# Patient Record
Sex: FEMALE | Race: WHITE | NOT HISPANIC OR LATINO | Employment: OTHER | URBAN - METROPOLITAN AREA
[De-identification: names, ages, dates, MRNs, and addresses within clinical notes are randomized per-mention and may not be internally consistent; named-entity substitution may affect disease eponyms.]

---

## 2023-08-21 ENCOUNTER — HOSPITAL ENCOUNTER (OUTPATIENT)
Dept: DATA CONVERSION | Facility: HOSPITAL | Age: 63
Discharge: HOME | End: 2023-08-21
Payer: COMMERCIAL

## 2023-08-21 DIAGNOSIS — Z12.31 ENCOUNTER FOR SCREENING MAMMOGRAM FOR MALIGNANT NEOPLASM OF BREAST: ICD-10-CM

## 2023-09-19 PROBLEM — D22.5 MELANOCYTIC NEVI OF TRUNK: Status: ACTIVE | Noted: 2018-01-26

## 2023-09-19 PROBLEM — E80.4 GILBERTS DISEASE: Status: ACTIVE | Noted: 2023-04-17

## 2023-09-19 PROBLEM — L82.1 OTHER SEBORRHEIC KERATOSIS: Status: ACTIVE | Noted: 2018-01-26

## 2023-09-19 PROBLEM — E83.42 HYPOMAGNESEMIA: Status: ACTIVE | Noted: 2020-07-30

## 2023-09-19 PROBLEM — D22.62 MELANOCYTIC NEVI OF LEFT UPPER LIMB, INCLUDING SHOULDER: Status: ACTIVE | Noted: 2018-01-26

## 2023-09-19 PROBLEM — M25.859 FEMOROACETABULAR IMPINGEMENT: Status: ACTIVE | Noted: 2023-09-19

## 2023-09-19 PROBLEM — E55.9 VITAMIN D DEFICIENCY: Status: ACTIVE | Noted: 2019-10-14

## 2023-09-19 PROBLEM — R73.09 ELEVATED GLUCOSE: Status: ACTIVE | Noted: 2019-10-15

## 2023-09-19 PROBLEM — E53.8 COBALAMIN DEFICIENCY: Status: ACTIVE | Noted: 2019-10-14

## 2023-09-19 PROBLEM — L57.0 ACTINIC KERATOSIS: Status: ACTIVE | Noted: 2018-01-26

## 2023-09-19 PROBLEM — K13.79 MOUTH SORES: Status: ACTIVE | Noted: 2023-04-17

## 2023-09-19 PROBLEM — D49.2 NEOPLASM OF UNSPECIFIED BEHAVIOR OF BONE, SOFT TISSUE, AND SKIN: Status: ACTIVE | Noted: 2018-01-26

## 2023-09-19 PROBLEM — R93.89 ABNORMAL CHEST CT: Status: ACTIVE | Noted: 2019-10-14

## 2023-09-19 PROBLEM — E78.2 MIXED HYPERLIPIDEMIA: Status: ACTIVE | Noted: 2019-11-26

## 2023-09-19 PROBLEM — M54.6 THORACIC BACK PAIN: Status: ACTIVE | Noted: 2022-05-16

## 2023-09-19 PROBLEM — R10.32 LLQ ABDOMINAL PAIN: Status: ACTIVE | Noted: 2022-05-10

## 2023-09-19 PROBLEM — D22.70 MELANOCYTIC NEVI OF LOWER LIMB, INCLUDING HIP: Status: ACTIVE | Noted: 2018-01-26

## 2023-09-19 PROBLEM — M25.551 HIP PAIN, BILATERAL: Status: ACTIVE | Noted: 2023-09-19

## 2023-09-19 PROBLEM — L91.8 OTHER HYPERTROPHIC DISORDERS OF THE SKIN: Status: ACTIVE | Noted: 2018-01-26

## 2023-09-19 PROBLEM — R35.0 URINE FREQUENCY: Status: ACTIVE | Noted: 2022-05-10

## 2023-09-19 PROBLEM — M25.552 HIP PAIN, BILATERAL: Status: ACTIVE | Noted: 2023-09-19

## 2023-09-19 PROBLEM — S29.012A UPPER BACK STRAIN: Status: ACTIVE | Noted: 2022-05-10

## 2023-09-19 PROBLEM — M54.2 NECK PAIN WITHOUT INJURY: Status: ACTIVE | Noted: 2021-01-04

## 2023-09-19 RX ORDER — ROSUVASTATIN CALCIUM 5 MG/1
TABLET, COATED ORAL
COMMUNITY
End: 2023-10-16 | Stop reason: SDUPTHER

## 2023-09-19 RX ORDER — VIT C/E/ZN/COPPR/LUTEIN/ZEAXAN 250MG-90MG
25 CAPSULE ORAL DAILY
COMMUNITY
Start: 2020-07-30

## 2023-09-19 RX ORDER — BACLOFEN 20 MG
500 TABLET ORAL DAILY
COMMUNITY
Start: 2022-04-11

## 2023-09-19 RX ORDER — MELOXICAM 15 MG/1
1 TABLET ORAL DAILY PRN
COMMUNITY
Start: 2022-01-31 | End: 2023-10-16 | Stop reason: ALTCHOICE

## 2023-10-16 ENCOUNTER — OFFICE VISIT (OUTPATIENT)
Dept: PRIMARY CARE | Facility: CLINIC | Age: 63
End: 2023-10-16
Payer: COMMERCIAL

## 2023-10-16 VITALS — DIASTOLIC BLOOD PRESSURE: 70 MMHG | SYSTOLIC BLOOD PRESSURE: 124 MMHG

## 2023-10-16 DIAGNOSIS — E78.2 MIXED HYPERLIPIDEMIA: ICD-10-CM

## 2023-10-16 PROCEDURE — 1036F TOBACCO NON-USER: CPT | Performed by: FAMILY MEDICINE

## 2023-10-16 PROCEDURE — 99213 OFFICE O/P EST LOW 20 MIN: CPT | Performed by: FAMILY MEDICINE

## 2023-10-16 RX ORDER — ROSUVASTATIN CALCIUM 5 MG/1
TABLET, COATED ORAL
Qty: 36 TABLET | Refills: 1 | Status: SHIPPED | OUTPATIENT
Start: 2023-10-16 | End: 2024-04-15 | Stop reason: SDUPTHER

## 2023-10-16 RX ORDER — LANOLIN ALCOHOL/MO/W.PET/CERES
100 CREAM (GRAM) TOPICAL DAILY
COMMUNITY

## 2023-10-16 ASSESSMENT — ENCOUNTER SYMPTOMS
CHEST TIGHTNESS: 0
ARTHRALGIAS: 0
ABDOMINAL PAIN: 0
UNEXPECTED WEIGHT CHANGE: 0
AGITATION: 0
HEADACHES: 0
WEAKNESS: 0
COUGH: 0
SHORTNESS OF BREATH: 0

## 2023-10-16 ASSESSMENT — PATIENT HEALTH QUESTIONNAIRE - PHQ9
2. FEELING DOWN, DEPRESSED OR HOPELESS: NOT AT ALL
1. LITTLE INTEREST OR PLEASURE IN DOING THINGS: NOT AT ALL
SUM OF ALL RESPONSES TO PHQ9 QUESTIONS 1 AND 2: 0

## 2023-10-16 NOTE — PROGRESS NOTES
Patient is here today for cholesterol recheck.  She is currently taking crestor 5mg daily 3 days per week.  She says she is doing well.  No new issues or problems.    Mammogram-  up to date done by GYN per patient  Colonoscopy   2019  Tdap  5/2019

## 2023-10-16 NOTE — PROGRESS NOTES
Subjective   Patient ID: Valeria Kraus is a 63 y.o. female who presents for Hyperlipidemia.  Hyperlipidemia  Pertinent negatives include no chest pain or shortness of breath.     Patient is here today for cholesterol recheck.  She is currently taking crestor 5mg daily 3 days per week.  She says she is doing well.  No new issues or problems.    Mammogram-  up to date done by GYN per patient  Colonoscopy   2019  Tdap  5/2019    HPI reviewed/agree  Generally pt feels fine  almost no dairy  no questions    Review of Systems   Constitutional:  Negative for unexpected weight change.   Respiratory:  Negative for cough, chest tightness and shortness of breath.    Cardiovascular:  Negative for chest pain.   Gastrointestinal:  Negative for abdominal pain.   Musculoskeletal:  Negative for arthralgias.   Skin:  Negative for rash.   Neurological:  Negative for weakness and headaches.   Psychiatric/Behavioral:  Negative for agitation.        Objective   Physical Exam  Constitutional:       Appearance: Normal appearance.   HENT:      Head: Normocephalic and atraumatic.      Nose: Nose normal.      Mouth/Throat:      Pharynx: Oropharynx is clear.   Eyes:      Extraocular Movements: Extraocular movements intact.      Pupils: Pupils are equal, round, and reactive to light.   Cardiovascular:      Rate and Rhythm: Normal rate and regular rhythm.      Pulses: Normal pulses.      Heart sounds: Normal heart sounds. No murmur heard.  Pulmonary:      Effort: Pulmonary effort is normal.      Breath sounds: Normal breath sounds.   Abdominal:      General: Abdomen is flat. Bowel sounds are normal.      Palpations: Abdomen is soft.      Tenderness: There is no abdominal tenderness.   Musculoskeletal:         General: Normal range of motion.   Skin:     General: Skin is warm and dry.   Neurological:      General: No focal deficit present.      Mental Status: She is alert and oriented to person, place, and time.   Psychiatric:         Mood and  Affect: Mood normal.         Behavior: Behavior normal.         Assessment/Plan   Diagnoses and all orders for this visit:  Mixed hyperlipidemia  -     rosuvastatin (Crestor) 5 mg tablet; take 1 tablet by mouth 3 times a week on [id number]  -     Lipid Panel; Future  -     Hepatic Function Panel; Future

## 2023-10-17 ENCOUNTER — LAB (OUTPATIENT)
Dept: LAB | Facility: LAB | Age: 63
End: 2023-10-17
Payer: COMMERCIAL

## 2023-10-17 DIAGNOSIS — E78.2 MIXED HYPERLIPIDEMIA: ICD-10-CM

## 2023-10-17 LAB
CHOLEST SERPL-MCNC: 214 MG/DL (ref 133–200)
CHOLEST/HDLC SERPL: 2.2 {RATIO}
HDLC SERPL-MCNC: 96 MG/DL
LDLC SERPL CALC-MCNC: 100 MG/DL (ref 65–130)
TRIGL SERPL-MCNC: 89 MG/DL (ref 40–150)

## 2023-10-17 PROCEDURE — 36415 COLL VENOUS BLD VENIPUNCTURE: CPT

## 2023-10-17 PROCEDURE — 80061 LIPID PANEL: CPT

## 2023-10-17 PROCEDURE — 80076 HEPATIC FUNCTION PANEL: CPT

## 2023-10-19 DIAGNOSIS — E78.2 MIXED HYPERLIPIDEMIA: Primary | ICD-10-CM

## 2023-10-19 LAB
ALBUMIN SERPL-MCNC: 4.3 G/DL (ref 3.5–5)
ALP BLD-CCNC: 51 U/L (ref 35–125)
ALT SERPL-CCNC: 21 U/L (ref 5–40)
AST SERPL-CCNC: 24 U/L (ref 5–40)
BILIRUB DIRECT SERPL-MCNC: 0.3 MG/DL (ref 0–0.2)
BILIRUB SERPL-MCNC: 1.4 MG/DL (ref 0.1–1.2)
PROT SERPL-MCNC: 6.6 G/DL (ref 5.9–7.9)

## 2024-04-15 ENCOUNTER — OFFICE VISIT (OUTPATIENT)
Dept: PRIMARY CARE | Facility: CLINIC | Age: 64
End: 2024-04-15
Payer: COMMERCIAL

## 2024-04-15 VITALS
HEART RATE: 67 BPM | OXYGEN SATURATION: 98 % | BODY MASS INDEX: 21.93 KG/M2 | DIASTOLIC BLOOD PRESSURE: 82 MMHG | WEIGHT: 123.8 LBS | SYSTOLIC BLOOD PRESSURE: 120 MMHG

## 2024-04-15 DIAGNOSIS — Z13.1 SCREENING FOR DIABETES MELLITUS: ICD-10-CM

## 2024-04-15 DIAGNOSIS — N95.9 MENOPAUSAL DISORDER: ICD-10-CM

## 2024-04-15 DIAGNOSIS — Z13.6 SCREENING, ISCHEMIC HEART DISEASE: Primary | ICD-10-CM

## 2024-04-15 DIAGNOSIS — E78.2 MIXED HYPERLIPIDEMIA: ICD-10-CM

## 2024-04-15 PROCEDURE — 99213 OFFICE O/P EST LOW 20 MIN: CPT | Performed by: PHYSICIAN ASSISTANT

## 2024-04-15 PROCEDURE — 1036F TOBACCO NON-USER: CPT | Performed by: PHYSICIAN ASSISTANT

## 2024-04-15 RX ORDER — ROSUVASTATIN CALCIUM 5 MG/1
TABLET, COATED ORAL
Qty: 36 TABLET | Refills: 1 | Status: SHIPPED | OUTPATIENT
Start: 2024-04-15

## 2024-04-15 ASSESSMENT — ENCOUNTER SYMPTOMS
LOSS OF SENSATION IN FEET: 0
DEPRESSION: 0
OCCASIONAL FEELINGS OF UNSTEADINESS: 0
FEVER: 0
FATIGUE: 0
ABDOMINAL PAIN: 0
SHORTNESS OF BREATH: 0

## 2024-04-15 ASSESSMENT — PATIENT HEALTH QUESTIONNAIRE - PHQ9
SUM OF ALL RESPONSES TO PHQ9 QUESTIONS 1 AND 2: 0
1. LITTLE INTEREST OR PLEASURE IN DOING THINGS: NOT AT ALL
2. FEELING DOWN, DEPRESSED OR HOPELESS: NOT AT ALL

## 2024-04-15 ASSESSMENT — COLUMBIA-SUICIDE SEVERITY RATING SCALE - C-SSRS
1. IN THE PAST MONTH, HAVE YOU WISHED YOU WERE DEAD OR WISHED YOU COULD GO TO SLEEP AND NOT WAKE UP?: NO
6. HAVE YOU EVER DONE ANYTHING, STARTED TO DO ANYTHING, OR PREPARED TO DO ANYTHING TO END YOUR LIFE?: NO
2. HAVE YOU ACTUALLY HAD ANY THOUGHTS OF KILLING YOURSELF?: NO

## 2024-04-15 ASSESSMENT — PAIN SCALES - GENERAL: PAINLEVEL: 0-NO PAIN

## 2024-04-15 NOTE — PROGRESS NOTES
Subjective   Patient ID: Valeria Kraus is a 63 y.o. female who presents for Establish Care (Pt is here to establish care, and medication refill / nr).    HPI   Previous Dr. Rios pt    HLD - controlled on 5mg rosuvastatin 3 days/wk. Nonsmoker. +FamHx MI (mom). Has never had CT Ca score    HM: mammogram, colonoscopy, pap UTD. Due for DEXA.    Review of Systems   Constitutional:  Negative for fatigue and fever.   Respiratory:  Negative for shortness of breath.    Cardiovascular:  Negative for chest pain.   Gastrointestinal:  Negative for abdominal pain.   Skin:  Negative for rash.       Objective   /82   Pulse 67   Wt 56.2 kg (123 lb 12.8 oz)   SpO2 98%   BMI 21.93 kg/m²     Physical Exam  Constitutional:       Appearance: Normal appearance.   HENT:      Head: Normocephalic and atraumatic.   Eyes:      Extraocular Movements: Extraocular movements intact.      Conjunctiva/sclera: Conjunctivae normal.   Cardiovascular:      Rate and Rhythm: Normal rate and regular rhythm.      Heart sounds: Normal heart sounds.   Pulmonary:      Effort: Pulmonary effort is normal.      Breath sounds: Normal breath sounds. No wheezing or rhonchi.   Musculoskeletal:      Cervical back: Normal range of motion and neck supple.   Skin:     General: Skin is warm.      Findings: No rash.   Neurological:      General: No focal deficit present.      Mental Status: She is alert.         Assessment/Plan   Problem List Items Addressed This Visit             ICD-10-CM    Mixed hyperlipidemia E78.2    Relevant Medications    rosuvastatin (Crestor) 5 mg tablet     Other Visit Diagnoses         Codes    Screening, ischemic heart disease    -  Primary Z13.6    Relevant Orders    CT cardiac scoring wo IV contrast    Menopausal disorder     N95.9    Relevant Orders    XR DEXA bone density    Screening for diabetes mellitus     Z13.1    Relevant Orders    Comprehensive Metabolic Panel

## 2024-04-17 ENCOUNTER — HOSPITAL ENCOUNTER (OUTPATIENT)
Dept: RADIOLOGY | Facility: CLINIC | Age: 64
Discharge: HOME | End: 2024-04-17
Payer: COMMERCIAL

## 2024-04-17 DIAGNOSIS — N95.9 MENOPAUSAL DISORDER: ICD-10-CM

## 2024-04-17 PROCEDURE — 77080 DXA BONE DENSITY AXIAL: CPT

## 2024-04-25 ENCOUNTER — LAB (OUTPATIENT)
Dept: LAB | Facility: LAB | Age: 64
End: 2024-04-25
Payer: COMMERCIAL

## 2024-04-25 DIAGNOSIS — Z13.1 SCREENING FOR DIABETES MELLITUS: ICD-10-CM

## 2024-04-25 LAB
ALBUMIN SERPL-MCNC: 4.3 G/DL (ref 3.5–5)
ALP BLD-CCNC: 52 U/L (ref 35–125)
ALT SERPL-CCNC: 21 U/L (ref 5–40)
ANION GAP SERPL CALC-SCNC: 11 MMOL/L
AST SERPL-CCNC: 28 U/L (ref 5–40)
BILIRUB SERPL-MCNC: 1.2 MG/DL (ref 0.1–1.2)
BUN SERPL-MCNC: 18 MG/DL (ref 8–25)
CALCIUM SERPL-MCNC: 9.4 MG/DL (ref 8.5–10.4)
CHLORIDE SERPL-SCNC: 101 MMOL/L (ref 97–107)
CO2 SERPL-SCNC: 26 MMOL/L (ref 24–31)
CREAT SERPL-MCNC: 0.9 MG/DL (ref 0.4–1.6)
EGFRCR SERPLBLD CKD-EPI 2021: 72 ML/MIN/1.73M*2
GLUCOSE SERPL-MCNC: 90 MG/DL (ref 65–99)
POTASSIUM SERPL-SCNC: 4.6 MMOL/L (ref 3.4–5.1)
PROT SERPL-MCNC: 6.6 G/DL (ref 5.9–7.9)
SODIUM SERPL-SCNC: 138 MMOL/L (ref 133–145)

## 2024-04-25 PROCEDURE — 36415 COLL VENOUS BLD VENIPUNCTURE: CPT

## 2024-04-25 PROCEDURE — 80053 COMPREHEN METABOLIC PANEL: CPT

## 2024-07-05 ENCOUNTER — HOSPITAL ENCOUNTER (OUTPATIENT)
Dept: RADIOLOGY | Facility: CLINIC | Age: 64
Discharge: HOME | End: 2024-07-05
Payer: MEDICARE

## 2024-07-05 DIAGNOSIS — Z13.6 SCREENING, ISCHEMIC HEART DISEASE: ICD-10-CM

## 2024-07-05 PROCEDURE — 75571 CT HRT W/O DYE W/CA TEST: CPT

## 2024-09-26 DIAGNOSIS — E78.2 MIXED HYPERLIPIDEMIA: ICD-10-CM

## 2024-09-26 RX ORDER — ROSUVASTATIN CALCIUM 5 MG/1
TABLET, COATED ORAL
Qty: 36 TABLET | Refills: 1 | Status: SHIPPED | OUTPATIENT
Start: 2024-09-26

## 2024-10-17 ENCOUNTER — OFFICE VISIT (OUTPATIENT)
Dept: PRIMARY CARE | Facility: CLINIC | Age: 64
End: 2024-10-17
Payer: MEDICARE

## 2024-10-17 VITALS
HEART RATE: 61 BPM | OXYGEN SATURATION: 97 % | DIASTOLIC BLOOD PRESSURE: 78 MMHG | SYSTOLIC BLOOD PRESSURE: 122 MMHG | WEIGHT: 119 LBS | BODY MASS INDEX: 21.08 KG/M2

## 2024-10-17 DIAGNOSIS — Z12.31 BREAST CANCER SCREENING BY MAMMOGRAM: ICD-10-CM

## 2024-10-17 DIAGNOSIS — Z00.00 ROUTINE MEDICAL EXAM: ICD-10-CM

## 2024-10-17 DIAGNOSIS — Z13.6 SCREENING, ISCHEMIC HEART DISEASE: ICD-10-CM

## 2024-10-17 DIAGNOSIS — Z13.1 SCREENING FOR DIABETES MELLITUS: ICD-10-CM

## 2024-10-17 DIAGNOSIS — Z00.00 ROUTINE GENERAL MEDICAL EXAMINATION AT HEALTH CARE FACILITY: Primary | ICD-10-CM

## 2024-10-17 DIAGNOSIS — Z11.59 ENCOUNTER FOR HEPATITIS C SCREENING TEST FOR LOW RISK PATIENT: ICD-10-CM

## 2024-10-17 DIAGNOSIS — Z28.21 23-POLYVALENT PNEUMOCOCCAL POLYSACCHARIDE VACCINE DECLINED: ICD-10-CM

## 2024-10-17 DIAGNOSIS — E78.2 MIXED HYPERLIPIDEMIA: ICD-10-CM

## 2024-10-17 DIAGNOSIS — E55.9 VITAMIN D DEFICIENCY: ICD-10-CM

## 2024-10-17 PROBLEM — S52.502A FRACTURE OF RADIUS, DISTAL, LEFT, CLOSED: Status: RESOLVED | Noted: 2019-06-11 | Resolved: 2024-10-17

## 2024-10-17 PROBLEM — R93.89 ABNORMAL CHEST CT: Status: RESOLVED | Noted: 2019-10-14 | Resolved: 2024-10-17

## 2024-10-17 LAB
POC APPEARANCE, URINE: CLEAR
POC BILIRUBIN, URINE: NEGATIVE
POC BLOOD, URINE: NEGATIVE
POC COLOR, URINE: YELLOW
POC GLUCOSE, URINE: NEGATIVE MG/DL
POC KETONES, URINE: NEGATIVE MG/DL
POC LEUKOCYTES, URINE: NEGATIVE
POC NITRITE,URINE: NEGATIVE
POC PH, URINE: 7.5 PH
POC PROTEIN, URINE: NEGATIVE MG/DL
POC SPECIFIC GRAVITY, URINE: 1.01
POC UROBILINOGEN, URINE: 0.2 EU/DL

## 2024-10-17 PROCEDURE — G0439 PPPS, SUBSEQ VISIT: HCPCS | Performed by: PHYSICIAN ASSISTANT

## 2024-10-17 PROCEDURE — 1160F RVW MEDS BY RX/DR IN RCRD: CPT | Performed by: PHYSICIAN ASSISTANT

## 2024-10-17 PROCEDURE — 1124F ACP DISCUSS-NO DSCNMKR DOCD: CPT | Performed by: PHYSICIAN ASSISTANT

## 2024-10-17 PROCEDURE — 1125F AMNT PAIN NOTED PAIN PRSNT: CPT | Performed by: PHYSICIAN ASSISTANT

## 2024-10-17 PROCEDURE — 99215 OFFICE O/P EST HI 40 MIN: CPT | Performed by: PHYSICIAN ASSISTANT

## 2024-10-17 PROCEDURE — 1170F FXNL STATUS ASSESSED: CPT | Performed by: PHYSICIAN ASSISTANT

## 2024-10-17 PROCEDURE — 81003 URINALYSIS AUTO W/O SCOPE: CPT | Performed by: PHYSICIAN ASSISTANT

## 2024-10-17 PROCEDURE — 93010 ELECTROCARDIOGRAM REPORT: CPT | Performed by: PHYSICIAN ASSISTANT

## 2024-10-17 PROCEDURE — 1036F TOBACCO NON-USER: CPT | Performed by: PHYSICIAN ASSISTANT

## 2024-10-17 PROCEDURE — 1159F MED LIST DOCD IN RCRD: CPT | Performed by: PHYSICIAN ASSISTANT

## 2024-10-17 ASSESSMENT — ENCOUNTER SYMPTOMS
WEAKNESS: 0
SHORTNESS OF BREATH: 0
ABDOMINAL PAIN: 0
DIZZINESS: 0
BLOOD IN STOOL: 0
NUMBNESS: 0
PALPITATIONS: 0
LIGHT-HEADEDNESS: 0
CONSTIPATION: 0
APPETITE CHANGE: 0
WHEEZING: 0
COUGH: 0
FEVER: 0
FATIGUE: 0
DIARRHEA: 0

## 2024-10-17 ASSESSMENT — ACTIVITIES OF DAILY LIVING (ADL)
ADEQUATE_TO_COMPLETE_ADL: YES
STIL DRIVING: YES
BATHING: INDEPENDENT
TAKING MEDICATION: INDEPENDENT
PILL BOX USED: NO
DOING HOUSEWORK: INDEPENDENT
JUDGMENT_ADEQUATE_SAFELY_COMPLETE_DAILY_ACTIVITIES: YES
NEEDS ASSISTANCE WITH FOOD: INDEPENDENT
USING TELEPHONE: INDEPENDENT
MANAGING FINANCES: INDEPENDENT
PREPARING MEALS: INDEPENDENT
EATING: INDEPENDENT
GROCERY SHOPPING: INDEPENDENT
TOILETING: INDEPENDENT
USING TRANSPORTATION: INDEPENDENT
FEEDING: INDEPENDENT
DRESSING: INDEPENDENT

## 2024-10-17 ASSESSMENT — COLUMBIA-SUICIDE SEVERITY RATING SCALE - C-SSRS
6. HAVE YOU EVER DONE ANYTHING, STARTED TO DO ANYTHING, OR PREPARED TO DO ANYTHING TO END YOUR LIFE?: NO
2. HAVE YOU ACTUALLY HAD ANY THOUGHTS OF KILLING YOURSELF?: NO
1. IN THE PAST MONTH, HAVE YOU WISHED YOU WERE DEAD OR WISHED YOU COULD GO TO SLEEP AND NOT WAKE UP?: NO

## 2024-10-17 ASSESSMENT — VISUAL ACUITY
OS_CC: 20/25
OD_CC: 20/25

## 2024-10-17 ASSESSMENT — COGNITIVE AND FUNCTIONAL STATUS - GENERAL
VERBAL FLUENCY - ANIMAL NAMES (0 TO 25): 3
TRAIL MAKING TEST: PATIENT COMPLETES TRAIL MAKING TEST PROPERLY.

## 2024-10-17 ASSESSMENT — PATIENT HEALTH QUESTIONNAIRE - PHQ9
1. LITTLE INTEREST OR PLEASURE IN DOING THINGS: NOT AT ALL
SUM OF ALL RESPONSES TO PHQ9 QUESTIONS 1 AND 2: 0
2. FEELING DOWN, DEPRESSED OR HOPELESS: NOT AT ALL

## 2024-10-17 ASSESSMENT — PAIN SCALES - GENERAL: PAINLEVEL_OUTOF10: 4

## 2024-10-17 NOTE — PROGRESS NOTES
Subjective   Patient ID: Valeria Kraus is a 64 y.o. female who presents for Annual Exam (Pt is here for welcome to medicare physical / nr).    HPI   Pt has been feeling well overall since last visit. She has remained active by going for a hike once a week, walking 3 miles daily with her dog, and weight lifting. Denies experiencing any associated chest pain, dyspnea, lightheadedness, vision changes. She does note BL foot discomfort which she attributes to her hiking shoes. She is currently trying out a new pair of shoes and is doing foot exercises with improvement. She is not interested in seeing a podiatrist at this time.    Her diet is well balanced and prioritizes chicken and low carbohydrates. Drinks 2 cups of coffee in the morning and the rest of the day she drinks water. Denies any abdominal pain, bowel changes, or blood in the stool.     She has remained compliant with Rosuvastatin and denies experiencing any adverse effects.     CT Calcium Score (07/24): 0  DEXA (04/24): osteopenia. Take Calcium and Vitamin D3 daily.  Mammogram: due   Pap smear: UTD  Colon Cancer screening: reportedly done 5 years ago. Will request results.    Review of Systems   Constitutional:  Negative for appetite change, fatigue and fever.   Respiratory:  Negative for cough, shortness of breath and wheezing.    Cardiovascular:  Negative for chest pain, palpitations and leg swelling.   Gastrointestinal:  Negative for abdominal pain, blood in stool, constipation and diarrhea.   Musculoskeletal:         BL foot pain   Neurological:  Negative for dizziness, weakness, light-headedness and numbness.       Objective   /78   Pulse 61   Wt 54 kg (119 lb)   SpO2 97%   BMI 21.08 kg/m²     Physical Exam  Constitutional:       Appearance: Normal appearance.   HENT:      Right Ear: Tympanic membrane, ear canal and external ear normal.      Left Ear: Tympanic membrane, ear canal and external ear normal.   Cardiovascular:      Rate and  Rhythm: Normal rate and regular rhythm.      Heart sounds: Normal heart sounds.   Neurological:      Mental Status: She is alert.         Assessment/Plan   Problem List Items Addressed This Visit             ICD-10-CM    Vitamin D deficiency E55.9     Other Visit Diagnoses         Codes    Routine medical exam    -  Primary Z00.00    Relevant Orders    POCT UA Automated manually resulted    Encounter for hepatitis C screening test for low risk patient     Z11.59        Annual exam - Doing well overall. Continue current medication regimen.     Will order repeat lipid panel, CMP, Vit D and Hep C. Ordered a Mammogram.  Declined Pneumococcal vaccination.    Follow up in 6 months or sooner if new symptoms develop.

## 2024-10-17 NOTE — PROGRESS NOTES
Subjective   Patient ID: Valeria Kraus is a 64 y.o. female who presents for Annual Exam (Pt is here for welcome to medicare physical / nr).    HPI   Active Medical Problems: osteopenia  Other Providers: eye doctor, dentist  PMH/PSH/FMH/SHX: Reviewed  Advanced Directives: Discussed  Hearing changes: Denies  Depression: Denies  Functional Ability/Safety: Get up and go test <30s. Denies issues with ADLs. No home safety concerns. Pt denies falls  Cognitive Eval: See MMSE  Preventative Services: mammogram 8/2023, pap 7/31/2023, DEXA w/osteopenia. Had colonoscopy - Dr. Batista (will have pt sign SUZIE to obtain records)    Review of Systems   HENT:  Negative for ear pain.    Eyes:  Negative for visual disturbance.   Respiratory:  Negative for shortness of breath.    Cardiovascular:  Negative for chest pain.   Gastrointestinal:  Negative for abdominal pain and blood in stool.   Skin:  Negative for rash.   Neurological:  Negative for dizziness and light-headedness.       Objective   /78   Pulse 61   Wt 54 kg (119 lb)   SpO2 97%   BMI 21.08 kg/m²     Physical Exam  Vitals reviewed.   Constitutional:       Appearance: Normal appearance.   HENT:      Head: Normocephalic and atraumatic.      Right Ear: Tympanic membrane and ear canal normal.      Left Ear: Tympanic membrane and ear canal normal.      Nose: Nose normal.      Mouth/Throat:      Mouth: Mucous membranes are moist.      Pharynx: No oropharyngeal exudate.   Eyes:      Extraocular Movements: Extraocular movements intact.      Conjunctiva/sclera: Conjunctivae normal.      Pupils: Pupils are equal, round, and reactive to light.   Cardiovascular:      Rate and Rhythm: Normal rate and regular rhythm.      Heart sounds: Normal heart sounds.   Pulmonary:      Effort: Pulmonary effort is normal.      Breath sounds: Normal breath sounds. No wheezing.   Abdominal:      General: There is no distension.      Palpations: Abdomen is soft.      Tenderness: There is no  abdominal tenderness.   Musculoskeletal:         General: No tenderness.      Cervical back: Normal range of motion and neck supple.   Skin:     General: Skin is warm and dry.      Findings: No rash.   Neurological:      General: No focal deficit present.      Mental Status: She is alert. Mental status is at baseline.   Psychiatric:         Mood and Affect: Mood normal.         Assessment/Plan   Problem List Items Addressed This Visit             ICD-10-CM    Vitamin D deficiency E55.9    Relevant Orders    Vitamin D 25-Hydroxy,Total (for eval of Vitamin D levels)    Mixed hyperlipidemia E78.2    Relevant Orders    Lipid Panel     Other Visit Diagnoses         Codes    Routine general medical examination at health care facility    -  Primary Z00.00    Routine medical exam     Z00.00    Relevant Orders    POCT UA Automated manually resulted (Completed)    Encounter for hepatitis C screening test for low risk patient     Z11.59    Relevant Orders    Hepatitis C Antibody    Screening for diabetes mellitus     Z13.1    Relevant Orders    Comprehensive Metabolic Panel    23-polyvalent pneumococcal polysaccharide vaccine declined     Z28.21    Breast cancer screening by mammogram     Z12.31    Relevant Orders    BI mammo bilateral screening tomosynthesis

## 2024-10-24 ENCOUNTER — HOSPITAL ENCOUNTER (OUTPATIENT)
Dept: RADIOLOGY | Facility: CLINIC | Age: 64
Discharge: HOME | End: 2024-10-24
Payer: MEDICARE

## 2024-10-24 VITALS — HEIGHT: 63 IN | WEIGHT: 119 LBS | BODY MASS INDEX: 21.09 KG/M2

## 2024-10-24 DIAGNOSIS — Z12.31 BREAST CANCER SCREENING BY MAMMOGRAM: ICD-10-CM

## 2024-10-24 PROCEDURE — 77067 SCR MAMMO BI INCL CAD: CPT

## 2024-11-04 ENCOUNTER — LAB (OUTPATIENT)
Dept: LAB | Facility: LAB | Age: 64
End: 2024-11-04
Payer: MEDICARE

## 2024-11-04 DIAGNOSIS — E55.9 VITAMIN D DEFICIENCY: ICD-10-CM

## 2024-11-04 DIAGNOSIS — Z13.1 SCREENING FOR DIABETES MELLITUS: ICD-10-CM

## 2024-11-04 DIAGNOSIS — E78.2 MIXED HYPERLIPIDEMIA: ICD-10-CM

## 2024-11-04 DIAGNOSIS — Z11.59 ENCOUNTER FOR HEPATITIS C SCREENING TEST FOR LOW RISK PATIENT: ICD-10-CM

## 2024-11-04 LAB
25(OH)D3 SERPL-MCNC: 95 NG/ML (ref 30–100)
ALBUMIN SERPL BCP-MCNC: 4.7 G/DL (ref 3.4–5)
ALP SERPL-CCNC: 54 U/L (ref 33–136)
ALT SERPL W P-5'-P-CCNC: 21 U/L (ref 7–45)
ANION GAP SERPL CALCULATED.3IONS-SCNC: 11 MMOL/L (ref 10–20)
AST SERPL W P-5'-P-CCNC: 27 U/L (ref 9–39)
BILIRUB SERPL-MCNC: 1.3 MG/DL (ref 0–1.2)
BUN SERPL-MCNC: 23 MG/DL (ref 6–23)
CALCIUM SERPL-MCNC: 10 MG/DL (ref 8.6–10.3)
CHLORIDE SERPL-SCNC: 99 MMOL/L (ref 98–107)
CHOLEST SERPL-MCNC: 206 MG/DL (ref 0–199)
CHOLEST/HDLC SERPL: 2 {RATIO}
CO2 SERPL-SCNC: 30 MMOL/L (ref 21–32)
CREAT SERPL-MCNC: 0.8 MG/DL (ref 0.5–1.05)
EGFRCR SERPLBLD CKD-EPI 2021: 82 ML/MIN/1.73M*2
GLUCOSE SERPL-MCNC: 99 MG/DL (ref 74–99)
HCV AB SER QL: NONREACTIVE
HDLC SERPL-MCNC: 103.4 MG/DL
LDLC SERPL CALC-MCNC: 93 MG/DL
NON HDL CHOLESTEROL: 103 MG/DL (ref 0–149)
POTASSIUM SERPL-SCNC: 4.2 MMOL/L (ref 3.5–5.3)
PROT SERPL-MCNC: 7.3 G/DL (ref 6.4–8.2)
SODIUM SERPL-SCNC: 136 MMOL/L (ref 136–145)
TRIGL SERPL-MCNC: 50 MG/DL (ref 0–149)
VLDL: 10 MG/DL (ref 0–40)

## 2024-11-04 PROCEDURE — 80053 COMPREHEN METABOLIC PANEL: CPT

## 2024-11-04 PROCEDURE — G0472 HEP C SCREEN HIGH RISK/OTHER: HCPCS

## 2024-11-04 PROCEDURE — 36415 COLL VENOUS BLD VENIPUNCTURE: CPT

## 2024-11-04 PROCEDURE — 82306 VITAMIN D 25 HYDROXY: CPT

## 2024-11-04 PROCEDURE — 80061 LIPID PANEL: CPT

## 2025-02-15 ENCOUNTER — OFFICE VISIT (OUTPATIENT)
Dept: URGENT CARE | Age: 65
End: 2025-02-15
Payer: MEDICARE

## 2025-02-15 VITALS
HEART RATE: 76 BPM | BODY MASS INDEX: 21.26 KG/M2 | DIASTOLIC BLOOD PRESSURE: 73 MMHG | SYSTOLIC BLOOD PRESSURE: 130 MMHG | RESPIRATION RATE: 18 BRPM | WEIGHT: 120 LBS | TEMPERATURE: 97.9 F | OXYGEN SATURATION: 100 % | HEIGHT: 63 IN

## 2025-02-15 DIAGNOSIS — U07.1 COVID: ICD-10-CM

## 2025-02-15 LAB
POC RAPID INFLUENZA A: NEGATIVE
POC RAPID INFLUENZA B: NEGATIVE
POC RAPID STREP: NEGATIVE
POC SARS-COV-2 AG BINAX: ABNORMAL

## 2025-02-15 PROCEDURE — 3008F BODY MASS INDEX DOCD: CPT | Performed by: SURGERY

## 2025-02-15 PROCEDURE — 87811 SARS-COV-2 COVID19 W/OPTIC: CPT | Performed by: SURGERY

## 2025-02-15 PROCEDURE — 87880 STREP A ASSAY W/OPTIC: CPT | Performed by: SURGERY

## 2025-02-15 PROCEDURE — 1036F TOBACCO NON-USER: CPT | Performed by: SURGERY

## 2025-02-15 PROCEDURE — 1159F MED LIST DOCD IN RCRD: CPT | Performed by: SURGERY

## 2025-02-15 PROCEDURE — 1160F RVW MEDS BY RX/DR IN RCRD: CPT | Performed by: SURGERY

## 2025-02-15 PROCEDURE — 87804 INFLUENZA ASSAY W/OPTIC: CPT | Performed by: SURGERY

## 2025-02-15 PROCEDURE — 99203 OFFICE O/P NEW LOW 30 MIN: CPT | Performed by: SURGERY

## 2025-02-15 RX ORDER — NIRMATRELVIR AND RITONAVIR 300-100 MG
3 KIT ORAL 2 TIMES DAILY
Qty: 30 TABLET | Refills: 0 | Status: CANCELLED | OUTPATIENT
Start: 2025-02-15 | End: 2025-02-20

## 2025-02-15 RX ORDER — PREDNISONE 50 MG/1
25 TABLET ORAL DAILY
Qty: 3 TABLET | Refills: 0 | Status: SHIPPED | OUTPATIENT
Start: 2025-02-15 | End: 2025-02-21

## 2025-02-15 NOTE — PROGRESS NOTES
Chief Complaint   Patient presents with    Earache     Nasal congestion, cough, sore throat, right ear pain x 3 days.         Physical Exam:     GEN: No acute distress    ENT: Bilateral TMs and canals unremarkable, sinus congestion present. Pharynx and tonsils mildly hyperemic but without exudate.     Resp: Lungs clear to auscultation bilaterally       POC Labs:     Office Visit on 02/15/2025   Component Date Value Ref Range Status    POC Rapid Strep 02/15/2025 Negative  Negative Final    POC CHER-COV-2 AG 02/15/2025 Positive test for SARS-CoV-2 (antigen detected) (A)  Presumptive negative test for SARS-CoV-2 (no antigen detected) Final        Encounter Diagnosis   Name Primary?    COVID         Medical Decision Making & Plan:   Declined Paxlovid   Prednisone for sx relief         02/15/25 at 9:47 AM - Inga Mcelroy, DO

## 2025-03-20 DIAGNOSIS — E78.2 MIXED HYPERLIPIDEMIA: ICD-10-CM

## 2025-03-20 RX ORDER — ROSUVASTATIN CALCIUM 5 MG/1
TABLET, COATED ORAL
Qty: 36 TABLET | Refills: 3 | Status: SHIPPED | OUTPATIENT
Start: 2025-03-20